# Patient Record
Sex: FEMALE | Race: WHITE
[De-identification: names, ages, dates, MRNs, and addresses within clinical notes are randomized per-mention and may not be internally consistent; named-entity substitution may affect disease eponyms.]

---

## 2021-11-25 ENCOUNTER — HOSPITAL ENCOUNTER (EMERGENCY)
Dept: HOSPITAL 35 - ER | Age: 76
Discharge: HOME | End: 2021-11-25
Payer: COMMERCIAL

## 2021-11-25 VITALS — WEIGHT: 189.99 LBS | HEIGHT: 60 IN | BODY MASS INDEX: 37.3 KG/M2

## 2021-11-25 VITALS — DIASTOLIC BLOOD PRESSURE: 76 MMHG | SYSTOLIC BLOOD PRESSURE: 159 MMHG

## 2021-11-25 DIAGNOSIS — Z79.891: ICD-10-CM

## 2021-11-25 DIAGNOSIS — Z79.899: ICD-10-CM

## 2021-11-25 DIAGNOSIS — Z20.822: ICD-10-CM

## 2021-11-25 DIAGNOSIS — I10: ICD-10-CM

## 2021-11-25 DIAGNOSIS — J18.9: Primary | ICD-10-CM

## 2021-11-25 LAB
ALBUMIN SERPL-MCNC: 3.3 G/DL (ref 3.4–5)
ALT SERPL-CCNC: 42 U/L (ref 14–59)
ANION GAP SERPL CALC-SCNC: 10 MMOL/L (ref 7–16)
AST SERPL-CCNC: 39 U/L (ref 15–37)
BASOPHILS NFR BLD AUTO: 0.3 % (ref 0–2)
BILIRUB SERPL-MCNC: 0.9 MG/DL (ref 0.2–1)
BUN SERPL-MCNC: 13 MG/DL (ref 7–18)
CALCIUM SERPL-MCNC: 9 MG/DL (ref 8.5–10.1)
CHLORIDE SERPL-SCNC: 98 MMOL/L (ref 98–107)
CO2 SERPL-SCNC: 25 MMOL/L (ref 21–32)
CREAT SERPL-MCNC: 0.9 MG/DL (ref 0.6–1)
EOSINOPHIL NFR BLD: 0.7 % (ref 0–3)
ERYTHROCYTE [DISTWIDTH] IN BLOOD BY AUTOMATED COUNT: 13 % (ref 10.5–14.5)
GLUCOSE SERPL-MCNC: 155 MG/DL (ref 74–106)
GRANULOCYTES NFR BLD MANUAL: 70.8 % (ref 36–66)
HCT VFR BLD CALC: 37.3 % (ref 37–47)
HGB BLD-MCNC: 12.2 GM/DL (ref 12–15)
LYMPHOCYTES NFR BLD AUTO: 11.4 % (ref 24–44)
MCH RBC QN AUTO: 29.3 PG (ref 26–34)
MCHC RBC AUTO-ENTMCNC: 32.8 G/DL (ref 28–37)
MCV RBC: 89.5 FL (ref 80–100)
MONOCYTES NFR BLD: 16.8 % (ref 1–8)
NEUTROPHILS # BLD: 5 THOU/UL (ref 1.4–8.2)
PLATELET # BLD: 187 THOU/UL (ref 150–400)
POTASSIUM SERPL-SCNC: 4.7 MMOL/L (ref 3.5–5.1)
PROT SERPL-MCNC: 8 G/DL (ref 6.4–8.2)
RBC # BLD AUTO: 4.17 MIL/UL (ref 4.2–5)
SODIUM SERPL-SCNC: 133 MMOL/L (ref 136–145)
WBC # BLD AUTO: 7 THOU/UL (ref 4–11)

## 2021-11-26 NOTE — EKG
[place] [address] [address] [facility] [employer] [place]
[place]  [address]
Phone:  [phone]                    ELECTROCARDIOGRAM REPORT      
_______________________________________________________________________________
 
Name:       JONATHAN PRUETT                    Room #:                     [facility] KWAME FLORES#:      [id number]     Account #:      [id number]  
Admission:  21    Attend Phys:                          
Discharge:  21    Date of Birth:  [birth year]  
                                                          Report #: [id number]
   [id number]
_______________________________________________________________________________
                         [facility] ED
                                       
Test Date:    2021               Test Time:    11:27:11
Pat Name:     JONATHAN PRUETT               Department:   
Patient ID:   [id number]            Room:          
Gender:       F                        Technician:   WILLIAM
:          1945               Requested By: Kristopher Coker
Order Number: [id number] MD:   Renato Stratton
                                 Measurements
Intervals                              Axis          
Rate:         75                       P:            15
ID:           180                      QRS:          2
QRSD:         117                      T:            21
QT:           394                                    
QTc:          441                                    
                           Interpretive Statements
Sinus rhythm
Left ventricular hypertrophy
Compared to ECG 2014 01:58:01
Left ventricular hypertrophy now present
Electronically Signed On 2021 10:00:02 CST by Renato Stratton
[email or web address]
 
 
 
 
 
 
 
 
 
 
 
 
 
 
 
 
 
 
 
 
 
  <ELECTRONICALLY SIGNED>
   By: Renato Stratton MD               
  21     1000
D: 21 1127                           _____________________________________
T: 21 1127                           Renato Stratton MD                 /EPI